# Patient Record
Sex: MALE | Race: BLACK OR AFRICAN AMERICAN | NOT HISPANIC OR LATINO | Employment: STUDENT | URBAN - METROPOLITAN AREA
[De-identification: names, ages, dates, MRNs, and addresses within clinical notes are randomized per-mention and may not be internally consistent; named-entity substitution may affect disease eponyms.]

---
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
## 2019-07-09 ENCOUNTER — OFFICE VISIT (OUTPATIENT)
Dept: FAMILY MEDICINE CLINIC | Facility: MEDICAL CENTER | Age: 19
End: 2019-07-09
Payer: COMMERCIAL

## 2019-07-09 VITALS
SYSTOLIC BLOOD PRESSURE: 138 MMHG | WEIGHT: 198 LBS | BODY MASS INDEX: 27.72 KG/M2 | HEART RATE: 68 BPM | HEIGHT: 71 IN | DIASTOLIC BLOOD PRESSURE: 78 MMHG | RESPIRATION RATE: 16 BRPM

## 2019-07-09 DIAGNOSIS — R42 DIZZINESS: ICD-10-CM

## 2019-07-09 DIAGNOSIS — L84 CALLUS OF FOOT: ICD-10-CM

## 2019-07-09 DIAGNOSIS — Z00.00 PHYSICAL EXAM, ANNUAL: Primary | ICD-10-CM

## 2019-07-09 PROBLEM — L70.9 ACNE: Status: ACTIVE | Noted: 2018-11-12

## 2019-07-09 PROBLEM — L81.0 POST-INFLAMMATORY HYPERPIGMENTATION: Status: ACTIVE | Noted: 2018-11-12

## 2019-07-09 PROCEDURE — 99395 PREV VISIT EST AGE 18-39: CPT | Performed by: FAMILY MEDICINE

## 2019-07-09 NOTE — PROGRESS NOTES
Assessment/Plan:    No problem-specific Assessment & Plan notes found for this encounter  Diagnoses and all orders for this visit:    Physical exam, annual  Dizziness  Callus of foot  Patient appears to be doing well overall  Continuation of low-calorie diet and regular exercise encouraged  Continued avoidance of tobacco products, alcohol and drugs encouraged  Continue use of condoms to prevent against STDs and unwanted pregnancies encouraged  Dizziness likely secondary to dehydration  I recommended checking some labs for further evaluation of this but patient declined  He will call the office if he decides he wants blood work  I recommended STD testing in the form of HIV, syphilis, gonorrhea and chlamydia as patient is sexually active  He declined at this time  He will call the office if he decides he wants STD testing  Patient does not have foot fungus but he does have a callus of the foot in the area of concern  I recommended he by a pumice stone for use in the shower and gently exfoliated the area regularly  Follow-up in one year or sooner if needed  Subjective:      Patient ID: Fozia Catalan is a 22 y o  male  Patient presents for a physical exam   He is concerned about dizziness that he experiences about twice per month  He believes is secondary to dehydration  He is very active with playing sports and eats a very healthy diet but admits he does not drink as much water as he should  When he is well hydrated he does not experience dizziness  He believes he has foot fungus on his right toe  He does play soccer at college  He does not smoke cigarettes  He does not drink alcohol  He does not use drugs  He is sexually active  He does have a female partner  He uses condoms for protection  He is inquiring about STD testing  He is otherwise asymptomatic with no penile lesions or penile discharge          The following portions of the patient's history were reviewed and updated as appropriate: He  has a past medical history of Patient denies medical problems  He   Patient Active Problem List    Diagnosis Date Noted    Acne 11/12/2018    Post-inflammatory hyperpigmentation 11/12/2018    Mild scoliosis 03/13/2017     He  has a past surgical history that includes No past surgeries  His family history includes Breast cancer in his mother; Diabetes in his mother; Hyperlipidemia in his mother; Prostate cancer in his family  He  reports that he has never smoked  He has never used smokeless tobacco  He reports that he does not drink alcohol or use drugs  No current outpatient medications on file  No current facility-administered medications for this visit  Current Outpatient Medications on File Prior to Visit   Medication Sig    [DISCONTINUED] mupirocin (BACTROBAN) 2 % cream Apply topically 3 (three) times a day for 10 days     No current facility-administered medications on file prior to visit  He has No Known Allergies  BMI Counseling: Body mass index is 27 62 kg/m²  Discussed the patient's BMI with him  The BMI is above average  BMI counseling and education was provided to the patient  Nutrition recommendations include decreasing overall calorie intake  Exercise recommendations include moderate aerobic physical activity for 150 minutes/week       Review of Systems   Constitutional: Negative for fever  Respiratory: Negative for shortness of breath  Cardiovascular: Negative for chest pain  Gastrointestinal: Negative for abdominal pain and blood in stool  Genitourinary: Negative for dysuria and hematuria  Musculoskeletal: Negative for arthralgias and myalgias  Skin: Negative for rash  Neurological: Negative for headaches           Objective:      /78 (BP Location: Left arm, Patient Position: Sitting, Cuff Size: Adult)   Pulse 68   Resp 16   Ht 5' 11" (1 803 m)   Wt 89 8 kg (198 lb)   BMI 27 62 kg/m²          Physical Exam Constitutional: He is oriented to person, place, and time  Vital signs are normal  He appears well-developed and well-nourished  HENT:   Head: Normocephalic and atraumatic  Right Ear: Tympanic membrane, external ear and ear canal normal    Left Ear: Tympanic membrane, external ear and ear canal normal    Nose: Nose normal    Mouth/Throat: Uvula is midline, oropharynx is clear and moist and mucous membranes are normal    Eyes: Pupils are equal, round, and reactive to light  Conjunctivae, EOM and lids are normal    Neck: Trachea normal  Neck supple  No thyromegaly present  Cardiovascular: Normal rate, regular rhythm, S1 normal and S2 normal    No murmur heard  Pulmonary/Chest: Effort normal and breath sounds normal    Abdominal: Soft  Bowel sounds are normal  There is no tenderness  Hernia confirmed negative in the right inguinal area and confirmed negative in the left inguinal area  Genitourinary: Testes normal and penis normal  Circumcised  Musculoskeletal:        Feet:    Lymphadenopathy:     He has no cervical adenopathy  Neurological: He is alert and oriented to person, place, and time  No cranial nerve deficit  Skin: Skin is warm, dry and intact  Psychiatric: He has a normal mood and affect   His speech is normal and behavior is normal  Thought content normal

## 2022-05-12 ENCOUNTER — OFFICE VISIT (OUTPATIENT)
Dept: FAMILY MEDICINE CLINIC | Facility: MEDICAL CENTER | Age: 22
End: 2022-05-12
Payer: COMMERCIAL

## 2022-05-12 VITALS
TEMPERATURE: 98.3 F | BODY MASS INDEX: 26.77 KG/M2 | WEIGHT: 202 LBS | HEART RATE: 90 BPM | SYSTOLIC BLOOD PRESSURE: 120 MMHG | DIASTOLIC BLOOD PRESSURE: 78 MMHG | OXYGEN SATURATION: 97 % | HEIGHT: 73 IN

## 2022-05-12 DIAGNOSIS — J30.9 ALLERGIC RHINITIS, UNSPECIFIED SEASONALITY, UNSPECIFIED TRIGGER: ICD-10-CM

## 2022-05-12 DIAGNOSIS — J02.9 SORE THROAT: Primary | ICD-10-CM

## 2022-05-12 LAB — S PYO AG THROAT QL: NEGATIVE

## 2022-05-12 PROCEDURE — 99213 OFFICE O/P EST LOW 20 MIN: CPT

## 2022-05-12 PROCEDURE — 3008F BODY MASS INDEX DOCD: CPT

## 2022-05-12 PROCEDURE — 3725F SCREEN DEPRESSION PERFORMED: CPT

## 2022-05-12 PROCEDURE — 87880 STREP A ASSAY W/OPTIC: CPT

## 2022-05-12 PROCEDURE — 87070 CULTURE OTHR SPECIMN AEROBIC: CPT

## 2022-05-12 RX ORDER — PREDNISONE 20 MG/1
20 TABLET ORAL 2 TIMES DAILY WITH MEALS
Qty: 6 TABLET | Refills: 0 | Status: SHIPPED | OUTPATIENT
Start: 2022-05-12 | End: 2022-05-15

## 2022-05-12 NOTE — PROGRESS NOTES
Assessment/Plan:       1  Sore throat  Rapid strep negative  Will send for throat culture  Advised patient he may take prednisone for 3 days to help with pain and inflammation in the throat  Patient also advised if sore throat symptoms persist despite starting allergy medications he may need to follow-up with ENT  - POCT rapid strepA  - Throat culture; Future  - Throat culture  - predniSONE 20 mg tablet; Take 1 tablet (20 mg total) by mouth in the morning and 1 tablet (20 mg total) in the evening  Take with meals  Do all this for 3 days  Dispense: 6 tablet; Refill: 0    2  Allergic rhinitis, unspecified seasonality, unspecified trigger  Advised patient to restart Zyrtec at bedtime and Flonase daily  Subjective:      Patient ID: Vinod Young is a 24 y o  male  24year old male presents with sore throat x 11 days, right ear pain with swallowing and conestion  Had a rapid strep done at school 2 days ago which was negative  However was not sent for throat culture to his knowledge  Reports history of tonsillitis 3 times since beginning of this year and was prescribed steroids once which helped  Denies fever, chills, body aches, cough  No sick contacts  Attends college, just graduated  Vaccinated for covid  Negative covid test pcr 3 days ago  Reports "pretty bad" seasonal allergies which he was taking Zyrtec for however he is not taking any medications at this time for allergies  The following portions of the patient's history were reviewed and updated as appropriate: allergies, current medications, past medical history, past social history, past surgical history and problem list     Review of Systems   Constitutional: Negative  Negative for fatigue  HENT: Positive for congestion, ear pain (right), postnasal drip and sore throat  Eyes: Negative  Respiratory: Negative for cough, shortness of breath and wheezing  Cardiovascular: Negative for chest pain  Gastrointestinal: Negative  Endocrine: Negative  Genitourinary: Negative  Musculoskeletal: Negative  Skin: Negative  Allergic/Immunologic: Negative  Neurological: Negative  Hematological: Negative  Psychiatric/Behavioral: Negative  Objective:      /78 (BP Location: Left arm, Patient Position: Sitting, Cuff Size: Large)   Pulse 90   Temp 98 3 °F (36 8 °C)   Ht 6' 0 5" (1 842 m)   Wt 91 6 kg (202 lb)   SpO2 97%   BMI 27 02 kg/m²          Physical Exam  Vitals and nursing note reviewed  Constitutional:       General: He is not in acute distress  Appearance: Normal appearance  He is well-developed  He is not ill-appearing  HENT:      Head: Normocephalic and atraumatic  Right Ear: Tympanic membrane and ear canal normal       Left Ear: Tympanic membrane and ear canal normal       Mouth/Throat: Tonsils: No tonsillar exudate or tonsillar abscesses  Eyes:      Conjunctiva/sclera: Conjunctivae normal       Pupils: Pupils are equal, round, and reactive to light  Cardiovascular:      Rate and Rhythm: Normal rate and regular rhythm  Pulses: Normal pulses  Heart sounds: Normal heart sounds  No murmur heard  Pulmonary:      Effort: Pulmonary effort is normal  No respiratory distress  Breath sounds: Normal breath sounds  No wheezing  Musculoskeletal:         General: Normal range of motion  Cervical back: Normal range of motion and neck supple  Lymphadenopathy:      Cervical: No cervical adenopathy  Skin:     General: Skin is warm and dry  Neurological:      General: No focal deficit present  Mental Status: He is alert and oriented to person, place, and time  Mental status is at baseline  Psychiatric:         Mood and Affect: Mood normal          Behavior: Behavior normal          Thought Content:  Thought content normal                     Wendy Man

## 2022-05-13 ENCOUNTER — TELEPHONE (OUTPATIENT)
Dept: FAMILY MEDICINE CLINIC | Facility: MEDICAL CENTER | Age: 22
End: 2022-05-13

## 2022-05-13 NOTE — TELEPHONE ENCOUNTER
----- Message from [address] [place] [address] sent at 5/13/2022  2:32 PM EDT -----  Please call patient and inform him throat culture negative for strep  He can complete 3 day course of steroids and should continue daily Zyrtec and Flonase

## 2022-05-14 LAB — BACTERIA THROAT CULT: NORMAL

## 2022-09-16 ENCOUNTER — OFFICE VISIT (OUTPATIENT)
Dept: FAMILY MEDICINE CLINIC | Facility: MEDICAL CENTER | Age: 22
End: 2022-09-16
Payer: COMMERCIAL

## 2022-09-16 ENCOUNTER — APPOINTMENT (OUTPATIENT)
Dept: LAB | Facility: MEDICAL CENTER | Age: 22
End: 2022-09-16
Payer: COMMERCIAL

## 2022-09-16 VITALS
HEART RATE: 70 BPM | OXYGEN SATURATION: 98 % | DIASTOLIC BLOOD PRESSURE: 80 MMHG | HEIGHT: 73 IN | SYSTOLIC BLOOD PRESSURE: 110 MMHG | BODY MASS INDEX: 26.64 KG/M2 | WEIGHT: 201 LBS | TEMPERATURE: 103.3 F

## 2022-09-16 DIAGNOSIS — J02.9 SORE THROAT: ICD-10-CM

## 2022-09-16 DIAGNOSIS — J02.9 PHARYNGITIS WITH VIRAL SYNDROME: Primary | ICD-10-CM

## 2022-09-16 DIAGNOSIS — R50.9 FEVER IN ADULT: ICD-10-CM

## 2022-09-16 DIAGNOSIS — B34.9 PHARYNGITIS WITH VIRAL SYNDROME: Primary | ICD-10-CM

## 2022-09-16 LAB — S PYO AG THROAT QL: NEGATIVE

## 2022-09-16 PROCEDURE — 86308 HETEROPHILE ANTIBODY SCREEN: CPT

## 2022-09-16 PROCEDURE — 99214 OFFICE O/P EST MOD 30 MIN: CPT

## 2022-09-16 PROCEDURE — 87070 CULTURE OTHR SPECIMN AEROBIC: CPT

## 2022-09-16 PROCEDURE — 36415 COLL VENOUS BLD VENIPUNCTURE: CPT

## 2022-09-16 PROCEDURE — 87880 STREP A ASSAY W/OPTIC: CPT

## 2022-09-16 NOTE — PATIENT INSTRUCTIONS
Your rapid strep test is negative  Throat culture has been sent to the lab  If your throat culture is positive I will treat with antibiotics otherwise symptom based treatment to be followed  Please have your mono blood test drawn, I will call with results  You may take Tylenol every 4 hours and ibuprofen every 6-8 hours for pain and fever  Rest, stay well hydrated  You may take medications for symptom relief such as Mucinex, nasal sprays  Call the office next week if your symptoms worsen or with persistent fever

## 2022-09-16 NOTE — PROGRESS NOTES
Assessment/Plan:    Symptom based treatment discussed with patient  Advised to control fever with Tylenol and ibuprofen  Advised to call the office with persistent fever or worsening symptoms  Send throat culture  Mono screen ordered  1  Pharyngitis with viral syndrome  Symptom based treatment discussed with patient  Advised to control fever with Tylenol and ibuprofen  Advised to call the office with persistent fever or worsening symptoms  Send throat culture  Mono screen ordered  Tylenol 1000 mg given in office for fever of 103 3  2  Sore throat  Component      Latest Ref Rng & Units 9/16/2022   RAPID STREP A      Negative Negative   - POCT rapid strepA  - Throat culture; Future  - Mononucleosis screen; Future  - Throat culture    3  Fever in adult  - Mononucleosis screen; Future      Subjective:      Patient ID: June Conklin is a 24 y o  male  24year old male presents with complaints of sore throat and feeling feverish since Wednesday around 4 am  He also reports feeling fatigue and body aches  Denies cough, congestion, ear pain  Denies sick contacts  Took at home covid test Wednesday which was negative, took Covid pcr test yesterday at [facility] which was also negative  He is vaccinated and boosted  He has been taking tylenol, nyquil, mucinex, vitamin C  He has not taken anything yet today  Has not checked temperature at home, just felt like he had a fever  No history of strep throat  The following portions of the patient's history were reviewed and updated as appropriate: allergies, current medications, past family history, past social history, past surgical history and problem list     Review of Systems   Constitutional: Positive for fatigue and fever  HENT: Positive for sore throat  Eyes: Negative  Respiratory: Negative  Cardiovascular: Negative  Gastrointestinal: Negative  Endocrine: Negative  Genitourinary: Negative  Musculoskeletal: Positive for myalgias  Skin: Negative  Allergic/Immunologic: Negative  Neurological: Negative  Hematological: Negative  Psychiatric/Behavioral: Negative  Objective:      /80 (BP Location: Left arm, Patient Position: Sitting, Cuff Size: Large)   Pulse 70   Temp (!) 103 3 °F (39 6 °C) (Oral)   Ht 6' 0 5" (1 842 m)   Wt 91 2 kg (201 lb)   SpO2 98%   BMI 26 89 kg/m²          Physical Exam  Vitals and nursing note reviewed  Constitutional:       General: He is not in acute distress  Appearance: Normal appearance  He is not ill-appearing  HENT:      Head: Normocephalic and atraumatic  Right Ear: Tympanic membrane, ear canal and external ear normal       Left Ear: Tympanic membrane, ear canal and external ear normal       Nose: Nose normal       Mouth/Throat:      Mouth: Mucous membranes are moist       Pharynx: Oropharynx is clear  Posterior oropharyngeal erythema present  Eyes:      General:         Right eye: No discharge  Left eye: No discharge  Conjunctiva/sclera: Conjunctivae normal       Pupils: Pupils are equal, round, and reactive to light  Cardiovascular:      Rate and Rhythm: Normal rate and regular rhythm  Pulses: Normal pulses  Heart sounds: Normal heart sounds  No murmur heard  Pulmonary:      Effort: Pulmonary effort is normal  No respiratory distress  Breath sounds: Normal breath sounds  No stridor  No wheezing, rhonchi or rales  Abdominal:      General: Abdomen is flat  Bowel sounds are normal       Palpations: Abdomen is soft  Tenderness: There is no abdominal tenderness  There is no guarding  Musculoskeletal:         General: Normal range of motion  Cervical back: Normal range of motion and neck supple  Lymphadenopathy:      Cervical: No cervical adenopathy  Skin:     General: Skin is warm and dry  Neurological:      General: No focal deficit present  Mental Status: He is alert and oriented to person, place, and time  Mental status is at baseline  Psychiatric:         Mood and Affect: Mood normal          Behavior: Behavior normal          Thought Content:  Thought content normal                     Basil Landeros

## 2022-09-17 LAB — HETEROPH AB SER QL: NEGATIVE

## 2022-09-18 LAB — BACTERIA THROAT CULT: NORMAL

## 2022-09-19 ENCOUNTER — TELEPHONE (OUTPATIENT)
Dept: FAMILY MEDICINE CLINIC | Facility: MEDICAL CENTER | Age: 22
End: 2022-09-19

## 2022-09-19 NOTE — TELEPHONE ENCOUNTER
----- Message from [address] [place] [address] sent at 9/17/2022  7:16 PM EDT -----  Please inform patient his mono test and throat culture are both negative  This is likely viral  Continue with symptom based treatment as discussed  If no improvement or worsening next week please let me know

## 2024-12-27 ENCOUNTER — OFFICE VISIT (OUTPATIENT)
Dept: URGENT CARE | Facility: CLINIC | Age: 24
End: 2024-12-27
Payer: COMMERCIAL

## 2024-12-27 VITALS
OXYGEN SATURATION: 99 % | HEART RATE: 87 BPM | SYSTOLIC BLOOD PRESSURE: 137 MMHG | DIASTOLIC BLOOD PRESSURE: 85 MMHG | WEIGHT: 204 LBS | RESPIRATION RATE: 18 BRPM | HEIGHT: 73 IN | TEMPERATURE: 98.2 F | BODY MASS INDEX: 27.04 KG/M2

## 2024-12-27 DIAGNOSIS — J02.9 SORE THROAT: ICD-10-CM

## 2024-12-27 DIAGNOSIS — J03.90 ACUTE TONSILLITIS, UNSPECIFIED ETIOLOGY: Primary | ICD-10-CM

## 2024-12-27 LAB — S PYO AG THROAT QL: NEGATIVE

## 2024-12-27 PROCEDURE — G0382 LEV 3 HOSP TYPE B ED VISIT: HCPCS | Performed by: NURSE PRACTITIONER

## 2024-12-27 PROCEDURE — S9083 URGENT CARE CENTER GLOBAL: HCPCS | Performed by: NURSE PRACTITIONER

## 2024-12-27 PROCEDURE — 87880 STREP A ASSAY W/OPTIC: CPT | Performed by: NURSE PRACTITIONER

## 2024-12-27 RX ORDER — AMOXICILLIN 500 MG/1
500 CAPSULE ORAL EVERY 8 HOURS SCHEDULED
Qty: 30 CAPSULE | Refills: 0 | Status: SHIPPED | OUTPATIENT
Start: 2024-12-27 | End: 2025-01-06

## 2024-12-27 NOTE — PROGRESS NOTES
[facility]        NAME: Fabryce Joseph is a 24 y.o. male  : 2000    MRN: [id number]  DATE: 2024  TIME: 11:03 AM    Assessment and Plan   Acute tonsillitis, unspecified etiology [J03.90]  1. Acute tonsillitis, unspecified etiology  amoxicillin (AMOXIL) 500 mg capsule      2. Sore throat  POCT rapid strepA        Rapid strep if negative. Bilateral tonsillar erythema. No exudate. Small amount of post nasal mucous present.     Patient Instructions       Follow up with PCP in 3-5 days.  Proceed to  ER if symptoms worsen.    Chief Complaint     Chief Complaint   Patient presents with    Sore Throat     Worsening sore throat x 1.5 weeks ago. Pt was seen by PCP in [place], tested for strep and was negative including culture.         History of Present Illness       Patient is a 24 year old male presenting with 10 days of worsening sore throat. He was seen by his PCP approximately 8 days ago. He had negative strep testing.  Denies fever or chills. Denies headache, congestion, or cough. He has been treating symptomatically without improvement.    Sore Throat   Pertinent negatives include no congestion, coughing, ear discharge, ear pain or headaches.       Review of Systems   Review of Systems   Constitutional:  Negative for activity change, chills and fever.   HENT:  Positive for sore throat. Negative for congestion, ear discharge, ear pain, sinus pressure and sinus pain.    Respiratory:  Negative for cough.    Neurological:  Negative for headaches.         Current Medications       Current Outpatient Medications:     amoxicillin (AMOXIL) 500 mg capsule, Take 1 capsule (500 mg total) by mouth every 8 (eight) hours for 10 days, Disp: 30 capsule, Rfl: 0    Current Allergies     Allergies as of 2024    (No Known Allergies)            The following portions of the patient's history were reviewed and updated as appropriate: allergies, current medications, past family history, past  "medical history, past social history, past surgical history and problem list.     Past Medical History:   Diagnosis Date    Patient denies medical problems        Past Surgical History:   Procedure Laterality Date    NO PAST SURGERIES         Family History   Problem Relation Age of Onset    Diabetes Mother     Hyperlipidemia Mother     Breast cancer Mother     Prostate cancer Family          Medications have been verified.        Objective   /85   Pulse 87   Temp 98.2 °F (36.8 °C)   Resp 18   Ht 6' 1\" (1.854 m)   Wt 92.5 kg (204 lb)   SpO2 99%   BMI 26.91 kg/m²        Physical Exam     Physical Exam  Vitals reviewed.   Constitutional:       General: He is awake. He is not in acute distress.     Appearance: Normal appearance. He is well-developed and normal weight.   HENT:      Head: Normocephalic.      Right Ear: Hearing, tympanic membrane, ear canal and external ear normal.      Left Ear: Hearing, ear canal and external ear normal. Tympanic membrane is erythematous.      Nose: Nose normal.      Mouth/Throat:      Lips: Pink.      Pharynx: Posterior oropharyngeal erythema and postnasal drip present. No pharyngeal swelling or uvula swelling.   Cardiovascular:      Rate and Rhythm: Normal rate and regular rhythm.      Heart sounds: Normal heart sounds, S1 normal and S2 normal.   Pulmonary:      Effort: Pulmonary effort is normal.      Breath sounds: Normal breath sounds. No decreased breath sounds, wheezing or rhonchi.   Skin:     General: Skin is warm and moist.   Neurological:      General: No focal deficit present.      Mental Status: He is alert and oriented to person, place, and time.   Psychiatric:         Behavior: Behavior is cooperative.                   "

## 2024-12-27 NOTE — PATIENT INSTRUCTIONS
Rapid strep: negative  Amoxicillin every 8 hours for 10 days for tonsillitis    Tylenol/Motrin as needed for pain/fever   Increase fluid intake   Throat lozenges, honey, salt water gargles for throat discomfort   Follow up with your PCP for worsening or concerning symptoms    Patient Education     Sore throat in adults   The Basics   Written by the doctors and editors at [facility]   What causes sore throat? -- Sore throat is usually caused by an infection. Two types of germs can cause it: viruses and bacteria.  People who have a sore throat caused by a virus do not usually need to see a doctor or nurse. But if you think that you might have been exposed to COVID-19, or if COVID-19 is common where you live, ask your doctor or nurse if you should be tested.  People who have a sore throat caused by bacteria might need to see a doctor or nurse. They might have a type of infection called strep throat. Only about 1 in 10 adults who seek medical care for sore throat have strep throat.  How can I tell if my sore throat is caused by a virus or strep throat? -- It is hard to tell the difference. But there are some clues to look for.  People who have a sore throat caused by a virus usually have other symptoms, such as:   Stuffy or runny nose   Itchy or red eyes   Cough  People who have COVID-19 can have a sore throat as their only symptom. Or they can have other symptoms such as fever, cough, trouble breathing, and problems with their sense of smell or taste. In most cases, the only way to know for sure if you have COVID-19 is to get tested.  People who have strep throat do not usually have a cough, runny nose, or itchy or red eyes. They might have:   Severe throat pain   Fever (temperature higher than 100.4°F or 38°C)   Swollen glands in the neck  If you think that you have strep throat, the doctor or nurse can easily check. They can take a sample from the back of your throat and test it for the bacteria that cause strep  "throat.  Do I need antibiotics? -- If you have an infection caused by a virus, you do not need antibiotics. But if you have strep throat, you should get antibiotics. Most people with strep throat get better without antibiotics, but doctors and nurses often prescribe them. This is because antibiotics often can prevent other problems that might be caused by strep throat. Plus, antibiotics can reduce the symptoms of strep throat and prevent you from spreading it to other people.  What can I do to feel better? -- To relieve the pain of sore throat, you can:   Take over-the-counter pain medicine - Acetaminophen (sample brand name: Tylenol) or ibuprofen (sample brand names: Advil, Motrin) can help with throat pain.   Use sore throat lozenges or sprays - Using medicated sore throat lozenges or throat sprays can temporarily reduce throat pain.   Suck on hard candies, ice chips, or ice pops.   Gargle with salt water - Some people find that this helps with throat pain.   Use a cool mist humidifier - This adds moisture to the air to keep the throat from getting too dry and might help with pain.   Avoid smoking or being around people who are smoking - Smoke can make throat pain worse.  When can I go back to work or school? -- If you have strep throat, wait 1 day after starting antibiotics. By then, you will be a lot less likely to spread the infection to others.  If you have COVID-19, stay home from work or school and \"self-isolate\" until your doctor or nurse tells you it's safe to stop. Self-isolation means staying apart from other people, even the people you live with. When you can stop self-isolation depends on how long it has been since you had symptoms, and in some cases, whether you have had a negative test (showing that the virus is no longer in your body).  If you have a sore throat that is not due to strep throat or COVID-19, you can go back to your usual activities as soon as you feel well. But it's still important to " wash your hands often and cover your mouth if you cough.  When should I call the doctor? -- Call your doctor or nurse if:   You have a fever of at least 101°F (38.4°C).   Your throat pain is severe within the first 2 days, or does not start to improve within 5 to 7 days.   You are having trouble getting enough to eat or drink.   You got antibiotics but still have symptoms after finishing them.  Call for an ambulance (in the [place] and [place], call 9-1-1) or go to the emergency department if you:   Have trouble breathing   Are drooling because you cannot swallow your saliva   Have swelling of the neck or tongue   Cannot move your neck, or have trouble opening your mouth  What can I do to prevent getting a sore throat again? -- Wash your hands often with soap and water (figure 1). It is one of the best ways to prevent the spread of infection.  All topics are updated as new evidence becomes available and our peer review process is complete.  This topic retrieved from [employer] on: Mar 13, 2024.  Topic 59036 Version 23.0  Release: 32.2.4 - C32.71  © 2024 UpToDate, Inc. and/or its affiliates. All rights reserved.  figure 1: How to wash your hands     Wet your hands with clean water, and apply a small amount of soap. Lather and rub hands together for at least 20 seconds. Clean your wrists, palms, backs of your hands, between your fingers, tips of your fingers, thumbs, and under and around your nails. Rinse well, and dry your hands using a clean towel.  Graphic 245966 Version 7.0  Consumer Information Use and Disclaimer   Disclaimer: This generalized information is a limited summary of diagnosis, treatment, and/or medication information. It is not meant to be comprehensive and should be used as a tool to help the user understand and/or assess potential diagnostic and treatment options. It does NOT include all information about conditions, treatments, medications, side effects, or risks that may apply to a specific patient. It  is not intended to be medical advice or a substitute for the medical advice, diagnosis, or treatment of a health care provider based on the health care provider's examination and assessment of a patient's specific and unique circumstances. Patients must speak with a health care provider for complete information about their health, medical questions, and treatment options, including any risks or benefits regarding use of medications. This information does not endorse any treatments or medications as safe, effective, or approved for treating a specific patient. Up[employer]Date, Inc. and its affiliates disclaim any warranty or liability relating to this information or the use thereof.The use of this information is governed by the Terms of Use, available at https://www.[employer] Intelligenceuwer.com/en/know/clinical-effectiveness-terms. 2024© UpToDate, Inc. and its affiliates and/or licensors. All rights reserved.  Copyright   © 2024 Up[employer]Date, Inc. and/or its affiliates. All rights reserved.